# Patient Record
Sex: FEMALE | Race: WHITE | NOT HISPANIC OR LATINO | URBAN - NONMETROPOLITAN AREA
[De-identification: names, ages, dates, MRNs, and addresses within clinical notes are randomized per-mention and may not be internally consistent; named-entity substitution may affect disease eponyms.]

---

## 2023-12-19 ENCOUNTER — OFFICE (OUTPATIENT)
Dept: URBAN - NONMETROPOLITAN AREA CLINIC 1 | Facility: CLINIC | Age: 63
End: 2023-12-19
Payer: COMMERCIAL

## 2023-12-19 VITALS
DIASTOLIC BLOOD PRESSURE: 78 MMHG | WEIGHT: 293 LBS | SYSTOLIC BLOOD PRESSURE: 126 MMHG | HEIGHT: 72 IN | HEART RATE: 74 BPM

## 2023-12-19 DIAGNOSIS — R19.7 DIARRHEA, UNSPECIFIED: ICD-10-CM

## 2023-12-19 DIAGNOSIS — R19.5 OTHER FECAL ABNORMALITIES: ICD-10-CM

## 2023-12-19 DIAGNOSIS — E11.9 TYPE 2 DIABETES MELLITUS WITHOUT COMPLICATIONS: ICD-10-CM

## 2023-12-19 DIAGNOSIS — I10 ESSENTIAL (PRIMARY) HYPERTENSION: ICD-10-CM

## 2023-12-19 PROCEDURE — 99204 OFFICE O/P NEW MOD 45 MIN: CPT | Performed by: NURSE PRACTITIONER

## 2023-12-19 NOTE — SERVICENOTES
Risk of procedure explained to patient she wishes to proceed.  
Bowel prep prescribed instructions given to patient.  
We will proceed with colonoscopy screening for CRC for age as well as positive Cologuard test
Instructed patient to hold thrombosis 7 days prior to procedure date
Increase fiber and water intake
Patient to consider Metamucil for bulking to help with diarrhea.  Also can consider Colestid if diarrhea persists.
Avoid NSAIDs
Hypertension and diabetes - PCP managing.

## 2023-12-19 NOTE — SERVICEHPINOTES
Patient with a history of asthma, diabetes, hypertension, hyperlipidemia, and hyperthyroidism presents to the clinic today for positive Cologuard screening.  She states in January of 2023 she had a positive Cologuard test through her primary care provider but has not had a colonoscopy since this result.  She denies any melena, hematochezia, abdominal pain, unintentional weight loss, nausea, vomiting, or fever.  She has a good appetite but states her bowels are loose to diarrhea which she attributes this to status post cholecystectomy as well as diabetes medications.  She will occasionally have heartburn but it is easily controlled by over-the-counter medications and not a daily occurrence.  She denies any hematemesis.  She is never had a colonoscopy.  She denies a family history of colorectal cancer, cardiac stents, or anticoagulation therapy.